# Patient Record
Sex: MALE | Race: WHITE | Employment: FULL TIME | URBAN - METROPOLITAN AREA
[De-identification: names, ages, dates, MRNs, and addresses within clinical notes are randomized per-mention and may not be internally consistent; named-entity substitution may affect disease eponyms.]

---

## 2018-01-18 ENCOUNTER — HOSPITAL ENCOUNTER (OUTPATIENT)
Age: 58
Setting detail: OUTPATIENT SURGERY
Discharge: HOME | End: 2018-01-18
Attending: SURGERY | Admitting: SURGERY
Payer: COMMERCIAL

## 2018-01-18 VITALS
HEIGHT: 71 IN | TEMPERATURE: 97.7 F | WEIGHT: 194.45 LBS | HEART RATE: 73 BPM | BODY MASS INDEX: 27.22 KG/M2 | DIASTOLIC BLOOD PRESSURE: 81 MMHG | OXYGEN SATURATION: 98 % | RESPIRATION RATE: 13 BRPM | SYSTOLIC BLOOD PRESSURE: 132 MMHG

## 2018-01-18 DIAGNOSIS — I87.2 VENOUS INSUFFICIENCY OF LEFT LOWER EXTREMITY: Primary | Chronic | ICD-10-CM

## 2018-01-18 PROCEDURE — 99152 MOD SED SAME PHYS/QHP 5/>YRS: CPT | Performed by: SURGERY

## 2018-01-18 PROCEDURE — 3600000002 HC SURGERY LEVEL 2 BASE: Performed by: SURGERY

## 2018-01-18 PROCEDURE — 2500000003 HC RX 250 WO HCPCS: Performed by: SURGERY

## 2018-01-18 PROCEDURE — 7100000011 HC PHASE II RECOVERY - ADDTL 15 MIN: Performed by: SURGERY

## 2018-01-18 PROCEDURE — 3600000012 HC SURGERY LEVEL 2 ADDTL 15MIN: Performed by: SURGERY

## 2018-01-18 PROCEDURE — 6360000002 HC RX W HCPCS: Performed by: SURGERY

## 2018-01-18 PROCEDURE — 2580000003 HC RX 258: Performed by: SURGERY

## 2018-01-18 PROCEDURE — C1894 INTRO/SHEATH, NON-LASER: HCPCS | Performed by: SURGERY

## 2018-01-18 PROCEDURE — A6454 SELF-ADHER BAND W>=3" <5"/YD: HCPCS | Performed by: SURGERY

## 2018-01-18 PROCEDURE — A6403 STERILE GAUZE>16 <= 48 SQ IN: HCPCS | Performed by: SURGERY

## 2018-01-18 PROCEDURE — 7100000001 HC PACU RECOVERY - ADDTL 15 MIN: Performed by: SURGERY

## 2018-01-18 PROCEDURE — 7100000000 HC PACU RECOVERY - FIRST 15 MIN: Performed by: SURGERY

## 2018-01-18 PROCEDURE — 7100000010 HC PHASE II RECOVERY - FIRST 15 MIN: Performed by: SURGERY

## 2018-01-18 PROCEDURE — 75894 X-RAYS TRANSCATH THERAPY: CPT

## 2018-01-18 PROCEDURE — 99153 MOD SED SAME PHYS/QHP EA: CPT | Performed by: SURGERY

## 2018-01-18 PROCEDURE — A6446 CONFORM BAND S W>=3" <5"/YD: HCPCS | Performed by: SURGERY

## 2018-01-18 RX ORDER — SODIUM CHLORIDE, SODIUM LACTATE, POTASSIUM CHLORIDE, CALCIUM CHLORIDE 600; 310; 30; 20 MG/100ML; MG/100ML; MG/100ML; MG/100ML
INJECTION, SOLUTION INTRAVENOUS CONTINUOUS
Status: DISCONTINUED | OUTPATIENT
Start: 2018-01-18 | End: 2018-01-18 | Stop reason: HOSPADM

## 2018-01-18 RX ORDER — HYDROCHLOROTHIAZIDE 12.5 MG/1
12.5 CAPSULE, GELATIN COATED ORAL DAILY
COMMUNITY

## 2018-01-18 RX ORDER — FENTANYL CITRATE 50 UG/ML
INJECTION, SOLUTION INTRAMUSCULAR; INTRAVENOUS PRN
Status: DISCONTINUED | OUTPATIENT
Start: 2018-01-18 | End: 2018-01-18 | Stop reason: HOSPADM

## 2018-01-18 RX ORDER — MIDAZOLAM HYDROCHLORIDE 1 MG/ML
INJECTION INTRAMUSCULAR; INTRAVENOUS PRN
Status: DISCONTINUED | OUTPATIENT
Start: 2018-01-18 | End: 2018-01-18 | Stop reason: HOSPADM

## 2018-01-18 RX ORDER — HYDROCODONE BITARTRATE AND ACETAMINOPHEN 5; 325 MG/1; MG/1
1 TABLET ORAL EVERY 6 HOURS PRN
Qty: 20 TABLET | Refills: 0 | Status: SHIPPED | OUTPATIENT
Start: 2018-01-18 | End: 2018-01-23

## 2018-01-18 RX ORDER — LIDOCAINE HYDROCHLORIDE 10 MG/ML
INJECTION, SOLUTION EPIDURAL; INFILTRATION; INTRACAUDAL; PERINEURAL PRN
Status: DISCONTINUED | OUTPATIENT
Start: 2018-01-18 | End: 2018-01-18 | Stop reason: HOSPADM

## 2018-01-18 RX ORDER — OLMESARTAN MEDOXOMIL 20 MG/1
20 TABLET ORAL DAILY
COMMUNITY

## 2018-01-18 RX ADMIN — SODIUM CHLORIDE, POTASSIUM CHLORIDE, SODIUM LACTATE AND CALCIUM CHLORIDE: 600; 310; 30; 20 INJECTION, SOLUTION INTRAVENOUS at 10:00

## 2018-01-18 ASSESSMENT — PAIN SCALES - GENERAL
PAINLEVEL_OUTOF10: 0

## 2018-01-18 ASSESSMENT — PAIN - FUNCTIONAL ASSESSMENT: PAIN_FUNCTIONAL_ASSESSMENT: 0-10

## 2018-01-18 NOTE — PRE SEDATION
Sedation Pre-Procedure Note    Patient Name: Charles Asencio   YOB: 1960  Room/Bed: [facility] OR Pool/NONE  Medical Record Number: [id number]  Date: 1/18/2018   Time: 1:16 PM       Indication:  LLE venous insufficiency    Consent: I have discussed with the patient and/or the patient representative the indication, alternatives, and the possible risks and/or complications of the planned procedure and the anesthesia methods. The patient and/or patient representative appear to understand and agree to proceed. Vital Signs:   Vitals:    01/18/18 1301   BP: (!) 148/98   Pulse: 69   Resp: 16   Temp: 97.7 °F (36.5 °C)   SpO2: 98%       Past Medical History:   has a past medical history of Asthma; H/O cardiovascular stress test; Hypertension; and Varicose vein of leg. Past Surgical History:   has a past surgical history that includes Umbilical hernia repair. Medications:   Scheduled Meds:    Continuous Infusions:    lactated ringers 100 mL/hr at 01/18/18 1000     PRN Meds: midazolam, fentaNYL, tumescent custom solution, lidocaine PF  Home Meds:   Prior to Admission medications    Medication Sig Start Date End Date Taking? Authorizing Provider   olmesartan (BENICAR) 20 MG tablet Take 20 mg by mouth daily   Yes Historical Provider, MD   hydrochlorothiazide (MICROZIDE) 12.5 MG capsule Take 12.5 mg by mouth daily   Yes Historical Provider, MD     Coumadin Use Last 7 Days:  no  Antiplatelet drug therapy use last 7 days: no  Other anticoagulant use last 7 days: no  Additional Medication Information:        Pre-Sedation Documentation and Exam:   I have personally completed a history, physical exam & review of systems for this patient (see notes).     Mallampati Airway Assessment:  Mallampati Class II - (soft palate, fauces & uvula are visible)    Prior History of Anesthesia Complications:   none    ASA Classification:  Class 2 - A normal healthy patient with mild systemic disease    Sedation/ Anesthesia Plan:

## 2018-01-18 NOTE — H&P
problems. No need for assistance with ambulation. Neuro:  Denies  neuropathy, syncopal episodes, weakness, vertigo, arthralgia, myalgia or numbness or tingling. Behavioral/Psych:  Pt denies current feeling of depression or significant anxiety. GENERAL PHYSICAL EXAM:            Vitals: BP (!) 148/94   Pulse 75   Temp 98.1 °F (36.7 °C) (Oral)   Resp 16   Ht 5' 11\" (1.803 m)   Wt 194 lb 7.1 oz (88.2 kg)   SpO2 100%   BMI 27.12 kg/m²  Body mass index is 27.12 kg/m². GENERAL APPEARANCE:  Gwen Knapp is a 56 y.o. male,  nourished, conscious, alert. Does not appear to be in distress or pain at this time. Skin:  Appears warm and dry without jaundice or cyanosis. No rashes or lesions. HEENT:  No facial swelling or tenderness. No  scleral icterus. No drainage from the ears, eyes or nose. Moist mucous membranes. No jugular vein distention. Carotid pulses present without bruit. Chest:  Symmetrical with equal expansion. Heart:  Regular rate and rhythm without murmur or rub. No extra heart sounds. Lungs: Clear to ausculation without rhonchi or wheeze. Nonlabored. Abdomen:  Soft, nontender. No flank pain with percussion. Lymphatics:  No palpable cervical or supraclavicular lymphadenopathy. Extremities:  Radial pulses 2+. Pedal pulses 2+. No edema. No calf tenderness. Negative sheas sign. Locomotor/ Back/Spine:  No para spinus tenderness. 5/5 strength upper and lower extremities. Neurological/Behavioral  Alert and oriented. Able to move all extremities. No facial droop. No slurred speech. Cranial nerves 2-12  grossly intact.                                 Madhavi Yao, CNP on 1/18/2018 at 9:41 AM

## 2018-01-19 NOTE — OP NOTE
[id number] [facility] [address]                 [address] [place] [address]                                 OPERATIVE REPORT    PATIENT NAME: Janie Hancock                   :        1960  MED REC NO:   [id number]                             ROOM:  ACCOUNT NO:   [de-identified]                           ADMIT DATE: 2018  PROVIDER:     Sreedhar Kunz OF PROCEDURE:  2018    PREOPERATIVE DIAGNOSES:  1. Symptomatic left lower extremity venous insufficiency. 2.  Symptomatic left lower extremity varicose veins. 3.  Left lower extremity incompetent perforators x2. POSTOPERATIVE DIAGNOSES:  1. Symptomatic left lower extremity venous insufficiency. 2.  Symptomatic left lower extremity varicose veins. 3.  Left lower extremity incompetent perforators x2. PROCEDURE:  1.  Endovenous laser ablation of the left greater saphenous vein with  ForTec laser. 2.  Ligation of left lower extremity incompetent perforators x2.  3.  Left lower extremity varicose vein stab avulsion x2.  4.  Conscious sedation. 5.  Ultrasound imaging of the left greater saphenous vein. SURGEON:  Nick Hand. Idania Mckeon MD    ANESTHESIA:  Local IV sedation. ESTIMATED BLOOD LOSS:  10 mL. COMPLICATIONS:  None. OPERATIVE INDICATIONS:  The patient is a 61-year-old gentleman who presents  with symptomatic left lower extremity varicose veins. Venous Duplex  demonstrates significant venous insufficiency involving the left greater  saphenous vein as well as several incompetent perforators at the medial and  posterior calf level. There is large varicosity masses present along the  posteromedial calf. At this time, he is being taken to the operating room  for elective endovenous ablation of the left greater saphenous vein with  ligation of the incompetent perforators and removal of the large  varicosities.     OPERATIVE TECHNIQUE:  After informed consent had been obtained, the patient  was brought to the operating room and placed in the supine position, and  the left leg was prepped and draped in the usual sterile fashion. Conscious sedation was then initiated by trained personnel using  appropriate monitoring equipment. A total of 2 mg of Versed and 100 mcg of  fentanyl was given throughout the procedure. The left greater saphenous  vein was then imaged from the saphenofemoral junction down to the ankle  level. The vein remained subfascial at the thigh level and the distal  thigh became coursed above the fascia into the lower leg. Several  encountered perforators were also noted. The skin was anesthetized using a  micropuncture technique. The greater saphenous vein was isolated and a  guidewire advanced to the saphenofemoral junction followed by the  introducer assembly. Tumescent anesthetic was then carried out along the  course of the greater saphenous vein. ForTec laser was then advanced to  the saphenofemoral junction and placed proximally 3 cm distal to the  saphenofemoral junction. The laser ablation was then carried out at the  greater saphenous vein. The laser probe was then removed. Attention was  then turned to the previously marked perforators and the areas were  anesthetized with 1% Xylocaine. The perforators were identified and  individually ligated with 3-0 Vicryl and divided. Next, two counter  incisions were made along several large varicosity masses and the  varicosities were excised and subsequently tied with 3-0 Vicryl. Incision  sites were then irrigated with Bacitracin solution and the skin  reapproximated using interrupted 4-0 Monocryl subcuticular suture. Mastisol and Steri-Strips were applied followed by a sterile Kerlix and  Coban dressing. The patient tolerated the procedure well and was  transferred to the recovery room in satisfactory condition. Sponge,  instrument, and needle counts were correct at the conclusion of the  operation.     A

## 2024-04-01 ENCOUNTER — HOSPITAL ENCOUNTER (EMERGENCY)
Age: 64
Discharge: HOME | End: 2024-04-02
Attending: EMERGENCY MEDICINE
Payer: COMMERCIAL

## 2024-04-01 VITALS
HEIGHT: 71 IN | WEIGHT: 207 LBS | SYSTOLIC BLOOD PRESSURE: 139 MMHG | OXYGEN SATURATION: 95 % | TEMPERATURE: 98.2 F | RESPIRATION RATE: 18 BRPM | BODY MASS INDEX: 28.98 KG/M2 | HEART RATE: 92 BPM | DIASTOLIC BLOOD PRESSURE: 94 MMHG

## 2024-04-01 DIAGNOSIS — R19.7 NAUSEA VOMITING AND DIARRHEA: Primary | ICD-10-CM

## 2024-04-01 DIAGNOSIS — K52.9 GASTROENTERITIS: ICD-10-CM

## 2024-04-01 DIAGNOSIS — R11.2 NAUSEA VOMITING AND DIARRHEA: Primary | ICD-10-CM

## 2024-04-01 LAB
ALBUMIN SERPL-MCNC: 4.4 G/DL (ref 3.5–5.2)
ALBUMIN/GLOB SERPL: 1.3 {RATIO} (ref 1–2.5)
ALP SERPL-CCNC: 52 U/L (ref 40–129)
ALT SERPL-CCNC: 50 U/L (ref 5–41)
ANION GAP SERPL CALCULATED.3IONS-SCNC: 14 MMOL/L (ref 9–17)
AST SERPL-CCNC: 41 U/L
BASOPHILS # BLD: 0 K/UL (ref 0–0.2)
BASOPHILS NFR BLD: 0 % (ref 0–2)
BILIRUB SERPL-MCNC: 0.7 MG/DL (ref 0.3–1.2)
BUN SERPL-MCNC: 29 MG/DL (ref 8–23)
CALCIUM SERPL-MCNC: 9 MG/DL (ref 8.6–10.4)
CHLORIDE SERPL-SCNC: 100 MMOL/L (ref 98–107)
CO2 SERPL-SCNC: 27 MMOL/L (ref 20–31)
CREAT SERPL-MCNC: 1.2 MG/DL (ref 0.7–1.2)
EOSINOPHIL # BLD: 0.15 K/UL (ref 0–0.4)
EOSINOPHILS RELATIVE PERCENT: 1 % (ref 1–4)
ERYTHROCYTE [DISTWIDTH] IN BLOOD BY AUTOMATED COUNT: 14.7 % (ref 12.5–15.4)
FLUAV AG SPEC QL: NEGATIVE
FLUBV AG SPEC QL: NEGATIVE
GFR SERPL CREATININE-BSD FRML MDRD: 68 ML/MIN/1.73M2
GLUCOSE SERPL-MCNC: 116 MG/DL (ref 70–99)
HCT VFR BLD AUTO: 43 % (ref 41–53)
HGB BLD-MCNC: 14.3 G/DL (ref 13.5–17.5)
LIPASE SERPL-CCNC: 14 U/L (ref 13–60)
LYMPHOCYTES NFR BLD: 0.92 K/UL (ref 1–4.8)
LYMPHOCYTES RELATIVE PERCENT: 6 % (ref 24–44)
MCH RBC QN AUTO: 27.7 PG (ref 26–34)
MCHC RBC AUTO-ENTMCNC: 33.3 G/DL (ref 31–37)
MCV RBC AUTO: 83.2 FL (ref 80–100)
METAMYELOCYTES ABSOLUTE COUNT: 0.15 K/UL
METAMYELOCYTES: 1 %
MONOCYTES NFR BLD: 1.08 K/UL (ref 0.1–0.8)
MONOCYTES NFR BLD: 7 % (ref 1–7)
MORPHOLOGY: NORMAL
NEUTROPHILS NFR BLD: 85 % (ref 36–66)
NEUTS SEG NFR BLD: 13.1 K/UL (ref 1.8–7.7)
PLATELET # BLD AUTO: 271 K/UL (ref 140–450)
PMV BLD AUTO: 8.2 FL (ref 6–12)
POTASSIUM SERPL-SCNC: 3.3 MMOL/L (ref 3.7–5.3)
PROT SERPL-MCNC: 7.7 G/DL (ref 6.4–8.3)
RBC # BLD AUTO: 5.17 M/UL (ref 4.5–5.9)
SODIUM SERPL-SCNC: 141 MMOL/L (ref 135–144)
WBC OTHER # BLD: 15.4 K/UL (ref 3.5–11)

## 2024-04-01 PROCEDURE — 6370000000 HC RX 637 (ALT 250 FOR IP)

## 2024-04-01 PROCEDURE — 96376 TX/PRO/DX INJ SAME DRUG ADON: CPT | Performed by: EMERGENCY MEDICINE

## 2024-04-01 PROCEDURE — 36415 COLL VENOUS BLD VENIPUNCTURE: CPT

## 2024-04-01 PROCEDURE — 85025 COMPLETE CBC W/AUTO DIFF WBC: CPT

## 2024-04-01 PROCEDURE — 96372 THER/PROPH/DIAG INJ SC/IM: CPT | Performed by: EMERGENCY MEDICINE

## 2024-04-01 PROCEDURE — 96374 THER/PROPH/DIAG INJ IV PUSH: CPT | Performed by: EMERGENCY MEDICINE

## 2024-04-01 PROCEDURE — 6360000002 HC RX W HCPCS

## 2024-04-01 PROCEDURE — 87635 SARS-COV-2 COVID-19 AMP PRB: CPT

## 2024-04-01 PROCEDURE — 2580000003 HC RX 258

## 2024-04-01 PROCEDURE — 83690 ASSAY OF LIPASE: CPT

## 2024-04-01 PROCEDURE — 87804 INFLUENZA ASSAY W/OPTIC: CPT

## 2024-04-01 PROCEDURE — 80053 COMPREHEN METABOLIC PANEL: CPT

## 2024-04-01 PROCEDURE — 99284 EMERGENCY DEPT VISIT MOD MDM: CPT | Performed by: EMERGENCY MEDICINE

## 2024-04-01 RX ORDER — SODIUM CHLORIDE, SODIUM LACTATE, POTASSIUM CHLORIDE, AND CALCIUM CHLORIDE .6; .31; .03; .02 G/100ML; G/100ML; G/100ML; G/100ML
2000 INJECTION, SOLUTION INTRAVENOUS ONCE
Status: COMPLETED | OUTPATIENT
Start: 2024-04-01 | End: 2024-04-02

## 2024-04-01 RX ORDER — ONDANSETRON 2 MG/ML
4 INJECTION INTRAMUSCULAR; INTRAVENOUS ONCE
Status: COMPLETED | OUTPATIENT
Start: 2024-04-01 | End: 2024-04-01

## 2024-04-01 RX ORDER — KETOROLAC TROMETHAMINE 15 MG/ML
15 INJECTION, SOLUTION INTRAMUSCULAR; INTRAVENOUS ONCE
Status: COMPLETED | OUTPATIENT
Start: 2024-04-01 | End: 2024-04-01

## 2024-04-01 RX ADMIN — SODIUM CHLORIDE, POTASSIUM CHLORIDE, SODIUM LACTATE AND CALCIUM CHLORIDE 2000 ML: 600; 310; 30; 20 INJECTION, SOLUTION INTRAVENOUS at 22:43

## 2024-04-01 RX ADMIN — POTASSIUM BICARBONATE 20 MEQ: 782 TABLET, EFFERVESCENT ORAL at 23:25

## 2024-04-01 RX ADMIN — ONDANSETRON 4 MG: 2 INJECTION INTRAMUSCULAR; INTRAVENOUS at 23:24

## 2024-04-01 RX ADMIN — ONDANSETRON 4 MG: 2 INJECTION INTRAMUSCULAR; INTRAVENOUS at 22:41

## 2024-04-01 RX ADMIN — KETOROLAC TROMETHAMINE 15 MG: 15 INJECTION, SOLUTION INTRAMUSCULAR; INTRAVENOUS at 23:25

## 2024-04-01 ASSESSMENT — PAIN SCALES - GENERAL: PAINLEVEL_OUTOF10: 7

## 2024-04-01 ASSESSMENT — LIFESTYLE VARIABLES
HOW OFTEN DO YOU HAVE A DRINK CONTAINING ALCOHOL: 2-4 TIMES A MONTH
HOW MANY STANDARD DRINKS CONTAINING ALCOHOL DO YOU HAVE ON A TYPICAL DAY: 1 OR 2

## 2024-04-01 ASSESSMENT — PAIN - FUNCTIONAL ASSESSMENT: PAIN_FUNCTIONAL_ASSESSMENT: 0-10

## 2024-04-02 LAB
SARS-COV-2 RDRP RESP QL NAA+PROBE: NOT DETECTED
SPECIMEN DESCRIPTION: NORMAL

## 2024-04-02 RX ORDER — ONDANSETRON 4 MG/1
4 TABLET, ORALLY DISINTEGRATING ORAL 3 TIMES DAILY PRN
Qty: 21 TABLET | Refills: 0 | Status: SHIPPED | OUTPATIENT
Start: 2024-04-02

## 2024-04-02 RX ORDER — ONDANSETRON 4 MG/1
4 TABLET, ORALLY DISINTEGRATING ORAL 3 TIMES DAILY PRN
Qty: 21 TABLET | Refills: 0 | Status: SHIPPED | OUTPATIENT
Start: 2024-04-02 | End: 2024-04-02

## 2024-04-02 ASSESSMENT — ENCOUNTER SYMPTOMS
ALLERGIC/IMMUNOLOGIC NEGATIVE: 1
ABDOMINAL DISTENTION: 0
ABDOMINAL PAIN: 0
NAUSEA: 1
VOMITING: 1
BLOOD IN STOOL: 0
DIARRHEA: 1
EYES NEGATIVE: 1
ANAL BLEEDING: 0
CONSTIPATION: 0

## 2024-04-02 NOTE — ED NOTES
Patient spent the entire day with nausea, diarrhea and dizziness. Patient's stomach is 6/10 pain and pressure headache 8/10. Denies chills.

## 2024-04-02 NOTE — ED PROVIDER NOTES
Attending Supervisory Note/Shared Visit   I have personally performed a face to face diagnostic evaluation on this patient. I have reviewed the mid-level’s findings and agree.     (Please note that portions of this note were completed with a voice recognition program.  Efforts were made to edit the dictations but occasionally words are mis-transcribed.)    Wes Wray MD  Attending Emergency Physician        Wes Wray MD  04/02/24 0020    
(H) 8 - 23 mg/dL    Creatinine 1.2 0.7 - 1.2 mg/dL    Est, Glom Filt Rate 68 >60 mL/min/1.73m2    Calcium 9.0 8.6 - 10.4 mg/dL    Total Protein 7.7 6.4 - 8.3 g/dL    Albumin 4.4 3.5 - 5.2 g/dL    Albumin/Globulin Ratio 1.3 1.0 - 2.5    Total Bilirubin 0.7 0.3 - 1.2 mg/dL    Alkaline Phosphatase 52 40 - 129 U/L    ALT 50 (H) 5 - 41 U/L    AST 41 (H) <40 U/L   Lipase   Result Value Ref Range    Lipase 14 13 - 60 U/L       Consults:  none    Procedures:  none    Re-Evaluation & Disposition:  See ED Course notes above.    The patient and/or family and I have discussed the diagnosis and risks, and we agree with discharging home to follow-up with their pertinent providers. The patient appears stable for discharge and has been instructed to return immediately for new concerning symptoms or if the symptoms worsen in any way. The patient understands that at this time there is no evidence for a more malignant underlying process, but the patient also understands that early in the process of an illness or injury, an emergency department workup can be falsely reassuring. Routine discharge counseling was given, and the patient understands that worsening, changing or persistent symptoms should prompt an immediate call or follow up with their primary physician or return to the emergency department.    I have reviewed the disposition diagnosis with the patient and or their family/guardian. I have answered their questions and given discharge instructions. They voiced understanding of these instructions and did not have any further questions or complaints.     This patient was seen by the attending physician and they agreed with the assessment and plan.       FINAL IMPRESSION      1. Nausea vomiting and diarrhea    2. Gastroenteritis          DISPOSITION / PLAN     Disposition Decision To Discharge    Patient Refferred to:  David Mckoy MD  [address] [facility] [address] [phone]    Call in 1 day  to arrange for close

## (undated) DEVICE — 3M™ WARMING BLANKET, UPPER BODY, 10 PER CASE, 42268: Brand: BAIR HUGGER™

## (undated) DEVICE — SUTURE VCRL SZ 3-0 L54IN ABSRB UD LIGAPAK REEL POLYGLACTIN J285G

## (undated) DEVICE — GLOVE SURG SZ 75 L12IN FNGR THK87MIL WHT LTX FREE

## (undated) DEVICE — BANDAGE COBAN 4 IN COMPR W4INXL5YD FOAM COHESIVE QUIK STK SELF ADH SFT

## (undated) DEVICE — INTENDED FOR TISSUE SEPARATION, AND OTHER PROCEDURES THAT REQUIRE A SHARP SURGICAL BLADE TO PUNCTURE OR CUT.: Brand: BARD-PARKER ® CARBON RIB-BACK BLADES

## (undated) DEVICE — MASTISOL ADHESIVE LIQ 2/3ML

## (undated) DEVICE — COVER US PRB W15XL120CM W/ GEL RUBBERBAND TAPE STRP FLD GEN

## (undated) DEVICE — CHLORAPREP 26ML ORANGE

## (undated) DEVICE — SYRINGE, LUER LOCK, 10ML: Brand: MEDLINE

## (undated) DEVICE — GOWN,AURORA,NONREINFORCED,LARGE: Brand: MEDLINE

## (undated) DEVICE — GLOVE SURG SZ 8 L11.77IN FNGR THK9.8MIL STRW LTX POLYMER

## (undated) DEVICE — DRAPE SURG EQUIP W102XL51CM E CIR SEWN BND BG

## (undated) DEVICE — SOLUTION IV 500ML 0.9% SOD CHL PH 5 INJ USP VIAFLX PLAS

## (undated) DEVICE — SUTURE VCRL SZ 2-0 L54IN ABSRB UD LIGAPAK REEL NDL J286G

## (undated) DEVICE — GEL US 20GM NONIRRITATING OVERWRAPPED FILE PCH TRNSMIT

## (undated) DEVICE — MICROPUNCTURE INTRODUCER SET SILHOUETTE TRANSITIONLESS PUSH-PLUS DESIGN - STIFFENED CANNULA WITH NITINOL WIRE GUIDE: Brand: MICROPUNCTURE

## (undated) DEVICE — Z DISCONTINUED NO SUB IDED PUMP SET INFLTR

## (undated) DEVICE — COVER US PRB W15XL244CM SURGICAL INTRAOPERATIVE W RUBBERBAND

## (undated) DEVICE — GLOVE SURG SZ 65 L12IN FNGR THK87MIL WHT LTX FREE

## (undated) DEVICE — STANDARD HYPODERMIC NEEDLE,POLYPROPYLENE HUB: Brand: MONOJECT

## (undated) DEVICE — DRAPE,REIN 53X77,STERILE: Brand: MEDLINE

## (undated) DEVICE — SURGICAL PROCEDURE KIT BASIN MAJ SET UP

## (undated) DEVICE — BANDAGE,GAUZE,BULKEE II,4.5"X4.1YD,STRL: Brand: MEDLINE

## (undated) DEVICE — GAUZE,SPONGE,FLUFF,6"X6.75",STRL,5/TRAY: Brand: MEDLINE

## (undated) DEVICE — STRIP,CLOSURE,WOUND,MEDI-STRIP,1/2X4: Brand: MEDLINE